# Patient Record
(demographics unavailable — no encounter records)

---

## 2024-10-31 NOTE — HISTORY OF PRESENT ILLNESS
[FreeTextEntry1] : LANETTE GARZA is a 23 yo M PMH LVH,  denies syncope, presyncope, palpitations  he underwent evaluation with ECHO and EKG ECHO is significant for LVH with septal thickness of 1.2cm, PW of 1.17cm  cMRI with maximum wall thickness 10mm EKG is significant for LVH  Results of his testing were discussed with him.  For a diagnosis of HCM in the presence of a positive family history of HCM he would need a wall thickness of 13mm.  As per ECHO he does have LVH, but not HCM, more consistent with athlete's heart.  His cMRI has normal wall thickness.   Discussed athletes' heart only causes LV hypertrophy to a maximum of 1.5 cm.  He may enter a gray zone between 1.3cm -1.5 cm where we would entertain both athletes heart and familial HCM diagnosis. But at this time he does not meet clinical criteria for HCM.  Due to his family history and LVH will begin with lifestyle recommendations to avoid risk factors associated with HCM.  It is unclear if avoiding these risk factors will prevent or delay development of HCM in the setting of a family history but the potential benefit outweighs the risk.   Recommend BP control, continue to exercise, avoid obesity, avoid excess EOTH and drug use.   today he  is referred for a cardiogenomic evaluation

## 2024-10-31 NOTE — ASSESSMENT
[FreeTextEntry1] : LANETTE GARZA is a 21 yo M PMH LVH,  denies syncope, palpitations he underwent evaluation with ECHO and EKG ECHO is significant for LVH with septal thickness of 1.2cm, PW of 1.17cm  cMRI with maximum wall thickness 10mm EKG is significant for LVH   Results of his testing were discussed with him.  For a diagnosis of HCM in the presence of a positive family history of HCM he would need a wall thickness of 13mm.  As per ECHO he does have LVH, but not HCM, more consistent with athlete's heart.  His cMRI has normal wall thickness.   Discussed athletes' heart only causes LV hypertrophy to a maximum of 1.5 cm.  He may enter a gray zone between 1.3cm -1.5 cm where we would entertain both athletes heart and familial HCM diagnosis. But at this time he does not meet clinical criteria for HCM.  Due to his family history and LVH will begin with lifestyle recommendations to avoid risk factors associated with HCM.  It is unclear if avoiding these risk factors will prevent or delay development of HCM in the setting of a family history but the potential benefit outweighs the risk.   Recommend BP control, continue to exercise, avoid obesity, avoid excess EOTH and drug use.   athletes heart family history of HCM, genetics negative annual cardiac evaluation with echo with strain and EKG.  If palpitations or syncope will consider event monitor.  consider fu cMRI if changes on ECHO or new symptoms otherwise will hold off for 5 years   fu in 1 yr unless symptoms   Frank Ramirez MD, PhD  Medical Director Program for Cardiac Genetics, Genomics and Precision Medicine Department of Cardiology [facility]  [employer] at [address] [place] [address] Tel: [phone] Fax: [phone]  ([facility] office) [facility] [address] [facility], 3rd floor (between [address]) [place] [address] (p) [phone] (f)  [phone]

## 2024-10-31 NOTE — REASON FOR VISIT
[FreeTextEntry1] : Dear            . I saw your patient LANETTE GARZA on 09/11/2024 . Please see the note below for the assessment and plan.   LANETTE GARZA  was seen  for an initial consultation at the Cardiogenomics Program at [facility] on 09/11/2024.   Mr. GARZA was referred by  family members for hereditary cardiac predisposition risk assessment and counseling, due to HCM

## 2024-10-31 NOTE — PLAN
[TextEntry] : reccomend continued cardiac evaluation with annual echo with strain.   Annual EKG if symptoms of presyncope, or palpitations then consider event monitor. fu cMRI every 5 years unless changes on ECHO or new symptoms  discussed lifestyle changes associated with reduced incidence of developing HCM Recommend BP control, continue to exercise, avoid obesity

## 2024-10-31 NOTE — REASON FOR VISIT
[FreeTextEntry1] : Dear            . I saw your patient LANETTE GARZA on 09/11/2024 . Please see the note below for the assessment and plan.   LANETTE GARZA  was seen  for an initial consultation at the Cardiogenomics Program at [facility] on 09/11/2024.   Mr. GARZA was referred by  family members for hereditary cardiac predisposition risk assessment and counseling, due to HCM

## 2024-10-31 NOTE — ASSESSMENT
[FreeTextEntry1] : LANETTE GARZA is a 21 yo M PMH LVH,  denies syncope, palpitations he underwent evaluation with ECHO and EKG ECHO is significant for LVH with septal thickness of 1.2cm, PW of 1.17cm  cMRI with maximum wall thickness 10mm EKG is significant for LVH   Results of his testing were discussed with him.  For a diagnosis of HCM in the presence of a positive family history of HCM he would need a wall thickness of 13mm.  As per ECHO he does have LVH, but not HCM, more consistent with athlete's heart.  His cMRI has normal wall thickness.   Discussed athletes' heart only causes LV hypertrophy to a maximum of 1.5 cm.  He may enter a gray zone between 1.3cm -1.5 cm where we would entertain both athletes heart and familial HCM diagnosis. But at this time he does not meet clinical criteria for HCM.  Due to his family history and LVH will begin with lifestyle recommendations to avoid risk factors associated with HCM.  It is unclear if avoiding these risk factors will prevent or delay development of HCM in the setting of a family history but the potential benefit outweighs the risk.   Recommend BP control, continue to exercise, avoid obesity, avoid excess EOTH and drug use.   athletes heart family history of HCM, genetics negative annual cardiac evaluation with echo with strain and EKG.  If palpitations or syncope will consider event monitor.  consider fu cMRI if changes on ECHO or new symptoms otherwise will hold off for 5 years   fu in 1 yr unless symptoms   Frank Ramirez MD, PhD  Medical Director Program for Cardiac Genetics, Genomics and Precision Medicine Department of Cardiology [facility]  [employer] at [address] [place] [address] Tel: [phone] Fax: [phone]  ([facility] office) [facility] [address] [facility], 3rd floor (between [address]) [place] [address] (p) [phone] (f)  [phone]

## 2024-10-31 NOTE — HISTORY OF PRESENT ILLNESS
[FreeTextEntry1] : LANETTE GARZA is a 21 yo M PMH LVH,  denies syncope, presyncope, palpitations  he underwent evaluation with ECHO and EKG ECHO is significant for LVH with septal thickness of 1.2cm, PW of 1.17cm  cMRI with maximum wall thickness 10mm EKG is significant for LVH  Results of his testing were discussed with him.  For a diagnosis of HCM in the presence of a positive family history of HCM he would need a wall thickness of 13mm.  As per ECHO he does have LVH, but not HCM, more consistent with athlete's heart.  His cMRI has normal wall thickness.   Discussed athletes' heart only causes LV hypertrophy to a maximum of 1.5 cm.  He may enter a gray zone between 1.3cm -1.5 cm where we would entertain both athletes heart and familial HCM diagnosis. But at this time he does not meet clinical criteria for HCM.  Due to his family history and LVH will begin with lifestyle recommendations to avoid risk factors associated with HCM.  It is unclear if avoiding these risk factors will prevent or delay development of HCM in the setting of a family history but the potential benefit outweighs the risk.   Recommend BP control, continue to exercise, avoid obesity, avoid excess EOTH and drug use.   today he  is referred for a cardiogenomic evaluation

## 2024-10-31 NOTE — ASSESSMENT
[FreeTextEntry1] : LANETTE GARZA is a 23 yo M PMH LVH,  denies syncope, palpitations he underwent evaluation with ECHO and EKG ECHO is significant for LVH with septal thickness of 1.2cm, PW of 1.17cm  cMRI with maximum wall thickness 10mm EKG is significant for LVH   Results of his testing were discussed with him.  For a diagnosis of HCM in the presence of a positive family history of HCM he would need a wall thickness of 13mm.  As per ECHO he does have LVH, but not HCM, more consistent with athlete's heart.  His cMRI has normal wall thickness.   Discussed athletes' heart only causes LV hypertrophy to a maximum of 1.5 cm.  He may enter a gray zone between 1.3cm -1.5 cm where we would entertain both athletes heart and familial HCM diagnosis. But at this time he does not meet clinical criteria for HCM.  Due to his family history and LVH will begin with lifestyle recommendations to avoid risk factors associated with HCM.  It is unclear if avoiding these risk factors will prevent or delay development of HCM in the setting of a family history but the potential benefit outweighs the risk.   Recommend BP control, continue to exercise, avoid obesity, avoid excess EOTH and drug use.   athletes heart family history of HCM, genetics negative annual cardiac evaluation with echo with strain and EKG.  If palpitations or syncope will consider event monitor.  consider fu cMRI if changes on ECHO or new symptoms otherwise will hold off for 5 years   fu in 1 yr unless symptoms   Frank Ramirez MD, PhD  Medical Director Program for Cardiac Genetics, Genomics and Precision Medicine Department of Cardiology [facility]  [employer] at [address] [place] [address] Tel: [phone] Fax: [phone]  ([facility] office) [facility] [address] [facility], 3rd floor (between [address]) [place] [address] (p) [phone] (f)  [phone]

## 2024-10-31 NOTE — REASON FOR VISIT
[FreeTextEntry1] : Dear            . I saw your patient LANETTE GARZA on 09/11/2024 . Please see the note below for the assessment and plan.   LANETTE GARZA  was seen  for an initial consultation at the Cardiogenomics Program at [facility] on 09/11/2024.   Mr. GARZA was referred by  family members for hereditary cardiac predisposition risk assessment and counseling, due to HCM